# Patient Record
Sex: MALE | Race: WHITE | Employment: OTHER | URBAN - METROPOLITAN AREA
[De-identification: names, ages, dates, MRNs, and addresses within clinical notes are randomized per-mention and may not be internally consistent; named-entity substitution may affect disease eponyms.]

---

## 2019-08-29 ENCOUNTER — ORDER TRANSCRIPTION (OUTPATIENT)
Dept: PHYSICAL THERAPY | Facility: HOSPITAL | Age: 84
End: 2019-08-29

## 2019-08-29 ENCOUNTER — OFFICE VISIT (OUTPATIENT)
Dept: PHYSICAL THERAPY | Facility: HOSPITAL | Age: 84
End: 2019-08-29
Attending: INTERNAL MEDICINE
Payer: MEDICARE

## 2019-08-29 DIAGNOSIS — R26.89 BALANCE PROBLEMS: ICD-10-CM

## 2019-08-29 DIAGNOSIS — M54.9 BACK PAIN: ICD-10-CM

## 2019-08-29 DIAGNOSIS — R26.89 BALANCE PROBLEMS: Primary | ICD-10-CM

## 2019-08-29 PROCEDURE — 97161 PT EVAL LOW COMPLEX 20 MIN: CPT | Performed by: PHYSICAL THERAPIST

## 2019-08-29 PROCEDURE — 97112 NEUROMUSCULAR REEDUCATION: CPT | Performed by: PHYSICAL THERAPIST

## 2019-08-29 NOTE — PROGRESS NOTES
NEUROLOGICAL EVALUATION:   Referring Physician: Dr. Yolette Danielle  Diagnosis: back pain, balance problems     Date of Onset: worse over 2 years ago Date of Service: 8/29/2019     PATIENT SUMMARY:   Jeb Mata is a 80year old y/o male who presents to  increased thoracic kyphosis, FHP, posterior pelvic tilt    Flexibility:  Hip Flexor: R short, L short  Hamstrings: R 45 degrees with SLR; L 45 degrees with SLR  Piriformis: R WFL; L WFL    Head to wall measurement:  12 cm  Kyphosis measurement:  25 cm (C7 or a total of 10 visits over a 90 day period. Treatment will include: Manual Therapy; Therapeutic Exercises; Neuromuscular Re-education; Therapeutic Activity; Electrical Stim; Ultrasound;  Patient education; Home exercise program instruction    Education o

## 2019-09-03 ENCOUNTER — APPOINTMENT (OUTPATIENT)
Dept: PHYSICAL THERAPY | Facility: HOSPITAL | Age: 84
End: 2019-09-03
Attending: PEDIATRICS
Payer: MEDICARE

## 2019-09-06 ENCOUNTER — APPOINTMENT (OUTPATIENT)
Dept: PHYSICAL THERAPY | Facility: HOSPITAL | Age: 84
End: 2019-09-06
Attending: PEDIATRICS
Payer: MEDICARE

## 2019-09-27 ENCOUNTER — OFFICE VISIT (OUTPATIENT)
Dept: PHYSICAL THERAPY | Facility: HOSPITAL | Age: 84
End: 2019-09-27
Attending: INTERNAL MEDICINE
Payer: MEDICARE

## 2019-09-27 PROCEDURE — 97112 NEUROMUSCULAR REEDUCATION: CPT | Performed by: PHYSICAL THERAPIST

## 2019-09-27 NOTE — PROGRESS NOTES
9/27/2019  Discharge Summary  Dx:     back pain, balance problems           Authorized # of Visits:  2/10      Next MD visit: none   Fall Risk: standard         Precautions:  Fall risk  Medication Changes since last visit?: No  Subjective: Reports he is go years ago Date of Service: 8/29/2019     PATIENT SUMMARY:   Keon Swain is a 80year old y/o male who presents to therapy today with complaints of frequent falls when walking. Tends to fall backwards, especially going up the stairs.   Has a large cu SLR; L 45 degrees with SLR  Piriformis: R WFL; L WFL    Head to wall measurement:  12 cm  Kyphosis measurement:  25 cm (C7 to T12)    MARK ROM and Strength  OBJECTIVE A/PROM Strength (-/5)        A/PROM Strength (-/5)    R L R L  R L R L   Shoulder     Hip

## 2019-10-04 ENCOUNTER — APPOINTMENT (OUTPATIENT)
Dept: PHYSICAL THERAPY | Facility: HOSPITAL | Age: 84
End: 2019-10-04
Attending: INTERNAL MEDICINE
Payer: MEDICARE

## 2019-10-25 ENCOUNTER — APPOINTMENT (OUTPATIENT)
Dept: PHYSICAL THERAPY | Facility: HOSPITAL | Age: 84
End: 2019-10-25
Attending: INTERNAL MEDICINE
Payer: MEDICARE